# Patient Record
Sex: MALE | URBAN - METROPOLITAN AREA
[De-identification: names, ages, dates, MRNs, and addresses within clinical notes are randomized per-mention and may not be internally consistent; named-entity substitution may affect disease eponyms.]

---

## 2023-01-01 ENCOUNTER — OFFICE VISIT (OUTPATIENT)
Dept: URBAN - METROPOLITAN AREA CLINIC 113 | Facility: CLINIC | Age: 68
End: 2023-01-01

## 2023-01-01 ENCOUNTER — TELEPHONE ENCOUNTER (OUTPATIENT)
Dept: URBAN - METROPOLITAN AREA CLINIC 113 | Facility: CLINIC | Age: 68
End: 2023-01-01

## 2023-01-01 ENCOUNTER — DASHBOARD ENCOUNTERS (OUTPATIENT)
Age: 68
End: 2023-01-01

## 2023-01-01 ENCOUNTER — WEB ENCOUNTER (OUTPATIENT)
Dept: URBAN - METROPOLITAN AREA CLINIC 113 | Facility: CLINIC | Age: 68
End: 2023-01-01

## 2023-01-01 VITALS
RESPIRATION RATE: 18 BRPM | DIASTOLIC BLOOD PRESSURE: 100 MMHG | BODY MASS INDEX: 34.63 KG/M2 | HEIGHT: 69 IN | WEIGHT: 233.8 LBS | SYSTOLIC BLOOD PRESSURE: 180 MMHG | HEART RATE: 67 BPM | TEMPERATURE: 97.5 F

## 2023-01-01 DIAGNOSIS — R19.7 ACUTE DIARRHEA: ICD-10-CM

## 2023-01-01 DIAGNOSIS — R74.8 ELEVATED LIVER ENZYMES: ICD-10-CM

## 2023-01-01 DIAGNOSIS — F10.10 ETOH ABUSE: ICD-10-CM

## 2023-01-01 DIAGNOSIS — K76.9 LIVER LESION: ICD-10-CM

## 2023-01-01 LAB
A/G RATIO: 1.7
ABSOLUTE BASOPHILS: 68
ABSOLUTE EOSINOPHILS: 330
ABSOLUTE LYMPHOCYTES: 2018
ABSOLUTE MONOCYTES: 908
ABSOLUTE NEUTROPHILS: 4178
AFP, SERUM, TUMOR MARKER: 2.6
ALBUMIN: 4.6
ALKALINE PHOSPHATASE: 66
ALT (SGPT): 43
AST (SGOT): 33
BASOPHILS: 0.9
BILIRUBIN, TOTAL: 0.9
BUN/CREATININE RATIO: 24
BUN: 27
CALCIUM: 9.9
CARBON DIOXIDE, TOTAL: 25
CHLORIDE: 100
COMMENT: (no result)
CREATININE: 1.14
EGFR: 70
EOSINOPHILS: 4.4
GLOBULIN, TOTAL: 2.7
GLUCOSE: 119
HCV RNA, QUANTITATIVE REAL TIME PCR: 1.38
HCV RNA, QUANTITATIVE REAL TIME PCR: 24
HEMATOCRIT: 44.2
HEMOGLOBIN: 15.3
HEPATITIS B CORE AB TOTAL: REACTIVE
HEPATITIS B SURFACE AB IMMUNITY, QN: 107
HEPATITIS B SURFACE ANTIGEN: (no result)
HEPATITIS C ANTIBODY: REACTIVE
HEPATITIS C VIRAL RNA: NOT DETECTED
HIV AG/AB, 4TH GEN: (no result)
LYMPHOCYTES: 26.9
MCH: 32.9
MCHC: 34.6
MCV: 95.1
MONOCYTES: 12.1
MPV: 9.9
NEUTROPHILS: 55.7
PLATELET COUNT: 270
POTASSIUM: 3.5
PROTEIN, TOTAL: 7.3
RDW: 12.7
RED BLOOD CELL COUNT: 4.65
REFLEX TIQ: (no result)
SIGNAL TO CUT-OFF: 10.36
SODIUM: 139
T-TRANSGLUTAMINASE (TTG) IGA: <1
TSH W/REFLEX TO FT4: 1.82
WHITE BLOOD CELL COUNT: 7.5

## 2023-01-01 PROCEDURE — 99204 OFFICE O/P NEW MOD 45 MIN: CPT

## 2023-01-01 PROCEDURE — 99244 OFF/OP CNSLTJ NEW/EST MOD 40: CPT

## 2023-01-01 RX ORDER — METOPROLOL TARTRATE 25 MG/1
1 TABLET WITH FOOD TABLET, FILM COATED ORAL TWICE A DAY
Status: ACTIVE | COMMUNITY

## 2023-01-01 RX ORDER — POTASSIUM CHLORIDE 1500 MG/1
1 TABLET WITH FOOD TABLET, EXTENDED RELEASE ORAL ONCE A DAY
Status: ACTIVE | COMMUNITY

## 2023-01-01 RX ORDER — PANTOPRAZOLE SODIUM 40 MG/1
1 TABLET TABLET, DELAYED RELEASE ORAL ONCE A DAY
Status: ACTIVE | COMMUNITY

## 2023-01-01 RX ORDER — IBUPROFEN 800 MG/1
1 TABLET WITH FOOD OR MILK AS NEEDED TABLET, FILM COATED ORAL
Status: ACTIVE | COMMUNITY

## 2023-01-01 RX ORDER — CLONAZEPAM 1 MG/1
1 TABLET TABLET ORAL ONCE A DAY
Status: ACTIVE | COMMUNITY

## 2023-01-01 RX ORDER — AMLODIPINE BESYLATE 10 MG/1
1 TABLET TABLET ORAL ONCE A DAY
Status: ACTIVE | COMMUNITY

## 2023-01-01 RX ORDER — VALSARTAN AND HYDROCHLOROTHIAZIDE 320; 12.5 MG/1; MG/1
1 TABLET TABLET, FILM COATED ORAL ONCE A DAY
Status: ACTIVE | COMMUNITY

## 2023-01-01 RX ORDER — TEMAZEPAM 30 MG/1
1 CAPSULE AT BEDTIME AS NEEDED CAPSULE ORAL ONCE A DAY
Status: ACTIVE | COMMUNITY

## 2023-01-01 RX ORDER — ATORVASTATIN CALCIUM 40 MG/1
1 TABLET TABLET, FILM COATED ORAL ONCE A DAY
Status: ACTIVE | COMMUNITY

## 2023-01-01 RX ORDER — BUPROPION HYDROCHLORIDE 300 MG/1
1 TABLET IN THE MORNING TABLET, EXTENDED RELEASE ORAL ONCE A DAY
Status: ACTIVE | COMMUNITY

## 2023-06-14 PROBLEM — 300331000: Status: ACTIVE | Noted: 2023-01-01

## 2023-06-14 PROBLEM — 15167005: Status: ACTIVE | Noted: 2023-01-01

## 2023-06-14 NOTE — HPI-TODAY'S VISIT:
68 year Old male with a history of chronic hepatitis C, hyperlipidemia, gout, depression, alcohol dependence, insomnia, hypertension, GERD, poliomyelitis, hepatitis B and prediabetes referred by Dr. Vikash Dee for irritable bowel syndrome with diarrhea.  A copy of today's visit will be forwarded to the referring provider.  Labs/12/23: Low potassium 3.3, glucose 111, ALT 50, AST 37, normal alk phos, normal total bilirubin, cholesterol 203, triglycerides 290, hemoglobin A1c 5.9, normal H/H, normal platelet count.  CT scan of the abdomen/pelvis with contrast performed for diarrhea on 4/20/2023: No acute inflammatory process within abdomen or pelvis.  Multiple small hypoattenuating lesions within the liver parenchyma, while these likely represent hemangiomas or cysts if there is an increased clinical suspicion for underlying malignancy or metastatic disease, hepatic MRI can be performed for further evaluation.  Hepatic steatosis.  Diverticulosis without evidence of acute diverticulitis.  No bowel wall thickening or increased fluid within the small bowel or colon.  He believes he had a colonsocopy within the last year. He believes this was done with our clinic. He states this was normal.   He had gout when he moved back to [place]. He was started on indomethacin. This was a about a year ago, also when his diarrhea began. He questioned whether the indomethacin was the cause of his diarrhea. He stopped the medication  a month ago. His diarrhea had decreased from daily to once a week. He still has diarrhea but now intermittent. He may have 3-4 loose stools in a day. Denies rectal bleeding. Denied abdominal pain, bloating, nausea or vomiting. Denies fevers or chills. He does have a possible family history of celiac disease. Denies a family history of IBD. Denies a family history of colon cancer.  He does drink ETOH daily. He consumes 3-4 mixed vodka cocktails per day. He was diagnosed as an alcoholic in the past. He was able to remain sober for three years in the past though started drinking again. He was a previous IVDA. He had hepatitis B and C in the past and these infections reportedly resolved on their own.

## 2023-06-21 PROBLEM — 128302006: Status: ACTIVE | Noted: 2023-01-01

## 2023-08-08 NOTE — HPI-TODAY'S VISIT:
68-year-old male presents for follow-up.  He was last seen on 6/14/2023.  Due to elevated liver enzymes he was plan for additional labs.  For his chronic diarrhea, celiac serologies and thyroid studies were planned.  He had stopped indomethacin a month prior which markedly improved his diarrhea suggesting medication side effect.  If diarrhea did not resolve, his PCP would consider stopping metoprolol as possible causative agent.  Due to his liver lesion noted on CT imaging, he was planned for MRI for better characterization. 6/14/2023:Detectable HCVRNA of 24 IU/mL, negative hepatitis B surface antigen, positive hepatitis B core antibody total, normal CBC, glucose 119, BUN 27, normal LFTs, normal TSH, normal tissue transglutaminase IgA, positive hepatitis B surface antibody, positive hepatitis C antibody, hepatitis C genotype not detected, normal AFP, negative HIV screen. We do not have an MRI for review. Colonoscopy with Dr. Melgar 1/28/2022:Normal mucosa in the cecum, ascending colon, transverse colon, descending colon, sigmoid colon and rectum.  No rectal abnormalities.  Repeat colonoscopy in 10 years for screening purposes.  6/14/2023: 68 year Old male with a history of chronic hepatitis C, hyperlipidemia, gout, depression, alcohol dependence, insomnia, hypertension, GERD, poliomyelitis, hepatitis B and prediabetes referred by Dr. Vikash Dee for irritable bowel syndrome with diarrhea.  A copy of today's visit will be forwarded to the referring provider.  Labs/12/23: Low potassium 3.3, glucose 111, ALT 50, AST 37, normal alk phos, normal total bilirubin, cholesterol 203, triglycerides 290, hemoglobin A1c 5.9, normal H/H, normal platelet count.  CT scan of the abdomen/pelvis with contrast performed for diarrhea on 4/20/2023: No acute inflammatory process within abdomen or pelvis.  Multiple small hypoattenuating lesions within the liver parenchyma, while these likely represent hemangiomas or cysts if there is an increased clinical suspicion for underlying malignancy or metastatic disease, hepatic MRI can be performed for further evaluation.  Hepatic steatosis.  Diverticulosis without evidence of acute diverticulitis.  No bowel wall thickening or increased fluid within the small bowel or colon.  He believes he had a colonsocopy within the last year. He believes this was done with our clinic. He states this was normal.   He had gout when he moved back to [place]. He was started on indomethacin. This was a about a year ago, also when his diarrhea began. He questioned whether the indomethacin was the cause of his diarrhea. He stopped the medication  a month ago. His diarrhea had decreased from daily to once a week. He still has diarrhea but now intermittent. He may have 3-4 loose stools in a day. Denies rectal bleeding. Denied abdominal pain, bloating, nausea or vomiting. Denies fevers or chills. He does have a possible family history of celiac disease. Denies a family history of IBD. Denies a family history of colon cancer.  He does drink ETOH daily. He consumes 3-4 mixed vodka cocktails per day. He was diagnosed as an alcoholic in the past. He was able to remain sober for three years in the past though started drinking again. He was a previous IVDA. He had hepatitis B and C in the past and these infections reportedly resolved on their own.